# Patient Record
Sex: MALE | Race: BLACK OR AFRICAN AMERICAN | Employment: FULL TIME | URBAN - METROPOLITAN AREA
[De-identification: names, ages, dates, MRNs, and addresses within clinical notes are randomized per-mention and may not be internally consistent; named-entity substitution may affect disease eponyms.]

---

## 2017-04-01 ENCOUNTER — HOSPITAL ENCOUNTER (EMERGENCY)
Age: 22
Discharge: HOME | End: 2017-04-02
Attending: EMERGENCY MEDICINE | Admitting: EMERGENCY MEDICINE
Payer: COMMERCIAL

## 2017-04-01 ENCOUNTER — APPOINTMENT (OUTPATIENT)
Dept: GENERAL RADIOLOGY | Age: 22
End: 2017-04-01
Attending: EMERGENCY MEDICINE
Payer: COMMERCIAL

## 2017-04-01 VITALS
HEART RATE: 74 BPM | BODY MASS INDEX: 29.4 KG/M2 | TEMPERATURE: 98 F | RESPIRATION RATE: 18 BRPM | SYSTOLIC BLOOD PRESSURE: 146 MMHG | DIASTOLIC BLOOD PRESSURE: 80 MMHG | OXYGEN SATURATION: 97 % | HEIGHT: 71 IN | WEIGHT: 210 LBS

## 2017-04-01 DIAGNOSIS — S60.022A CONTUSION OF LEFT INDEX FINGER WITHOUT DAMAGE TO NAIL, INITIAL ENCOUNTER: ICD-10-CM

## 2017-04-01 DIAGNOSIS — S60.10XA SUBUNGUAL HEMATOMA OF FINGER OF LEFT HAND, INITIAL ENCOUNTER: Primary | ICD-10-CM

## 2017-04-01 PROCEDURE — 75810000106 HC EVAC SUBUNGUAL HEMATOMA

## 2017-04-01 PROCEDURE — 74011250636 HC RX REV CODE- 250/636: Performed by: PHYSICIAN ASSISTANT

## 2017-04-01 PROCEDURE — 99282 EMERGENCY DEPT VISIT SF MDM: CPT

## 2017-04-01 PROCEDURE — 96372 THER/PROPH/DIAG INJ SC/IM: CPT

## 2017-04-01 PROCEDURE — 73140 X-RAY EXAM OF FINGER(S): CPT

## 2017-04-01 RX ORDER — KETOROLAC TROMETHAMINE 30 MG/ML
30 INJECTION, SOLUTION INTRAMUSCULAR; INTRAVENOUS
Status: COMPLETED | OUTPATIENT
Start: 2017-04-01 | End: 2017-04-01

## 2017-04-01 RX ADMIN — KETOROLAC TROMETHAMINE 30 MG: 30 INJECTION, SOLUTION INTRAMUSCULAR at 23:20

## 2017-04-01 NOTE — LETTER
[facility] [place] 
THE [facility] EMERGENCY DEPT 
Humberto Fay [id number] [phone] Andra Fernandez Date: 4/2/2017 Sex: male Duarte Amador [address] [place] [address] YOB: 1995 Age: 25 y.o. Occupational Medicine Return to Work Form          Date of Treatment:  4/1/2017 Diagnosis: ICD-10-CM ICD-9-CM 1. Subungual hematoma of finger of left hand, initial encounter S60.10XA 923.3 2. Contusion of left index finger without damage to nail, initial encounter S60.022A 923.3 Instructions:  Employee must return copy of this report to Personnel and/or Supervisor. Patient Identification - Company Protocol:   
   Checked & Followed   Not Available PART I:  Check Treatment X-ray     Lab    Discharge Instructions Given    Rx Given Non-Prescription Meds    Sutures       EKG Other (Comment):   
 
Part II:  Disposition May Return to Regular Work Without Restrictions May Return to Restricted Duty as Below Explanation of RESTRICTIONS (Comment): May NOT Return to Work until cleared by Referral Specialist or Occupational Medicine MD 
 
Part III:  Follow-Up Follow-up Information Follow up With Details Comments Contact Info [facility] OCCUPATIONAL MED Schedule an appointment as soon as possible for a visit in 2 days for worker's compensation follow up [address] [place] [address] [address] [facility] [address] [phone] [facility] EMERGENCY DEPT Go to As needed, If symptoms worsen 2 Bernardine  
[address] [address] [phone] Part IV: Was Workers Comp Supervisor Notified     Yes     No 
 
Andra Fernandez was seen in the Emergency Department on 4/1/2017 by the following provider(s): 
Attending Provider: Luis Enrique Simons MD 
Physician Assistant: KADY Lauren; ED Nurse: PLEASE CALL CASE FACILITATOR, OCCUPATIONAL MEDICINE  
[phone] TO SCHEDULE AN APPOINTMENT Referral Physicians: Ivy Cardona MD 
[address]ony Abdi. [address] [facility]. [facility], [address] [address] [facility], [address] Phone:  [phone]; Fax:  [phone] ORTHOPEDICS [address] [facility] [address] [place] [address]., [address] [facility] [address] [place] . [address]    [facility], [facility] [address] 
[phone] [facility] [address] [address] [facility] [facility], [facility] [address] 
[phone]

## 2017-04-02 RX ORDER — TRAMADOL HYDROCHLORIDE 50 MG/1
50 TABLET ORAL
Qty: 20 TAB | Refills: 0 | Status: SHIPPED | OUTPATIENT
Start: 2017-04-02 | End: 2019-09-14

## 2017-04-02 NOTE — ED PROVIDER NOTES
[address]  EMERGENCY DEPARTMENT HISTORY AND PHYSICAL EXAM       Date: 4/1/2017   Patient Name: Nathan Toledo   YOB: 1995  Medical Record Number: [id number]    History of Presenting Illness     Chief Complaint   Patient presents with    Finger Pain        History Provided By:  patient    Additional History:   10:53 PM    Nathan Toledo is a 25 y.o. male presenting to the ED c/o left 2nd finger pain onset around 1200 while at work. States he jammed his finger at his occupation as a  and now has dark discoloration to his nail. Reports taking Advil earlier without relief. Denies numbness, tingling, weakness, and any other sxs or complaints. Primary Care Provider: None   Specialist:    Past History     Past Medical History:   History reviewed. No pertinent past medical history. Past Surgical History:   History reviewed. No pertinent surgical history. Family History:   History reviewed. No pertinent family history. Social History:   Social History   Substance Use Topics    Smoking status: Never Smoker    Smokeless tobacco: None    Alcohol use No        Allergies:   No Known Allergies     Review of Systems   Review of Systems   Musculoskeletal: Positive for arthralgias. Skin: Positive for color change (to right 2nd nail bed). Negative for wound. Neurological: Negative for weakness and numbness. All other systems reviewed and are negative. Physical Exam  Vitals:    04/01/17 2221   BP: 146/80   Pulse: 74   Resp: 18   Temp: 98 °F (36.7 °C)   SpO2: 97%   Weight: 95.3 kg (210 lb)   Height: 5' 11\" (1.803 m)       Physical Exam   Constitutional: He is oriented to person, place, and time. He appears well-developed and well-nourished. No distress. HENT:   Head: Normocephalic and atraumatic. Mouth/Throat: No oropharyngeal exudate. Eyes: Conjunctivae and EOM are normal. No scleral icterus. No pallor   Neck: Normal range of motion.  Neck supple. No JVD present. No tracheal deviation present. No thyromegaly present. Pulmonary/Chest: Effort normal. No stridor. No respiratory distress. Abdominal: Soft. Musculoskeletal: Normal range of motion. He exhibits tenderness. He exhibits no edema. Left index finger with 100% subungual hematoma and appropriately tender, no open lesions, FROM of DIPJ, PIPJ, MCP joints. No other soft tissue injuries. Pressure released from fingernail by continually twisting an 18 gauge needle though the superior nail bed until blood almost entirely evacuated from beneath   Neurological: He is alert and oriented to person, place, and time. He has normal reflexes. No cranial nerve deficit. Coordination normal.   Skin: Skin is warm and dry. No rash noted. He is not diaphoretic. No erythema. Psychiatric: His behavior is normal. Judgment and thought content normal.   Very anxious about any procedure, otherwise normal.     Nursing note and vitals reviewed. Diagnostic Study Results     Labs -    No results found for this or any previous visit (from the past 12 hour(s)). Radiologic Studies -    11:51 PM  RADIOLOGY FINDINGS  Left 2nd finger X-ray shows nothing acute  Pending review by Radiologist  Recorded by Sulema Seth ED Scribe, as dictated by Rose Marie Spangler PA-C    XR 2ND FINGER LT MIN 2 V    (Results Pending)        Medical Decision Making   I am the first provider for this patient. I reviewed the vital signs, available nursing notes, past medical history, past surgical history, family history and social history. Vital Signs-Reviewed the patient's vital signs. Patient Vitals for the past 12 hrs:   Temp Pulse Resp BP SpO2   04/01/17 2221 98 °F (36.7 °C) 74 18 146/80 97 %     Old Medical Records: Nursing notes    ED Course:    10:53 PM   Initial assessment performed.          Medications Given in the ED:  Medications   ketorolac (TORADOL) injection 30 mg (30 mg IntraMUSCular Given 4/1/17 8770) Discharge Note:  12:12 AM   Pt has been reexamined. Patient has no new complaints, changes, or physical findings. Care plan outlined and precautions discussed. Results were reviewed with the patient. All medications were reviewed with the patient; will d/c home. All of pt's questions and concerns were addressed. Patient was instructed and agrees to follow up with PCP, as well as to return to the ED upon further deterioration. Patient is ready to go home. Diagnosis   Clinical Impression:   1. Subungual hematoma of finger of left hand, initial encounter    2. Contusion of left index finger without damage to nail, initial encounter           Follow-up Information     Follow up With Details Comments Contact Info    [address] Schedule an appointment as soon as possible for a visit in 2 days for worker's compensation follow up [address]  [address]    THE [facility] EMERGENCY DEPT Go to As needed, If symptoms worsen 2 Susannah Carranza  [phone]          Current Discharge Medication List      START taking these medications    Details   traMADol (ULTRAM) 50 mg tablet Take 1 Tab by mouth every six (6) hours as needed for Pain. Max Daily Amount: 200 mg. Qty: 20 Tab, Refills: 0             _______________________________   Attestations: This note is prepared by Stewart Pride, acting as a Scribe for Theresa Jett PA-C on 10:43 PM on 4/1/2017 . Theresa Jett PA-C: The scribe's documentation has been prepared under my direction and personally reviewed by me in its entirety.   _______________________________

## 2017-04-02 NOTE — ED NOTES
Smashed finger today at work, bruising noted under fingernail causing pain, no open areas noted, good CNS and movement noted to finger.

## 2017-04-02 NOTE — ED NOTES
Discharge instructions given to pt. pt verbalized understanding discharge instructions. Patient left emergency department by foot  With self, in good condition. 1 Prescriptions given. Armband removed and shredded.

## 2019-09-14 ENCOUNTER — HOSPITAL ENCOUNTER (EMERGENCY)
Age: 24
Discharge: HOME | End: 2019-09-14
Attending: EMERGENCY MEDICINE
Payer: COMMERCIAL

## 2019-09-14 VITALS
HEART RATE: 66 BPM | RESPIRATION RATE: 18 BRPM | OXYGEN SATURATION: 99 % | SYSTOLIC BLOOD PRESSURE: 140 MMHG | DIASTOLIC BLOOD PRESSURE: 72 MMHG | TEMPERATURE: 98.9 F

## 2019-09-14 DIAGNOSIS — S61.212A LACERATION OF RIGHT MIDDLE FINGER WITHOUT FOREIGN BODY WITHOUT DAMAGE TO NAIL, INITIAL ENCOUNTER: Primary | ICD-10-CM
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
## 2019-09-14 PROCEDURE — 99282 EMERGENCY DEPT VISIT SF MDM: CPT

## 2019-09-14 PROCEDURE — 90715 TDAP VACCINE 7 YRS/> IM: CPT | Performed by: PHYSICIAN ASSISTANT

## 2019-09-14 PROCEDURE — 74011250636 HC RX REV CODE- 250/636: Performed by: PHYSICIAN ASSISTANT

## 2019-09-14 PROCEDURE — 90471 IMMUNIZATION ADMIN: CPT

## 2019-09-14 RX ORDER — AMOXICILLIN AND CLAVULANATE POTASSIUM 875; 125 MG/1; MG/1
1 TABLET, FILM COATED ORAL 2 TIMES DAILY
Qty: 20 TAB | Refills: 0 | Status: SHIPPED | OUTPATIENT
Start: 2019-09-14 | End: 2019-09-24

## 2019-09-14 RX ADMIN — TETANUS TOXOID, REDUCED DIPHTHERIA TOXOID AND ACELLULAR PERTUSSIS VACCINE, ADSORBED 0.5 ML: 5; 2.5; 8; 8; 2.5 SUSPENSION INTRAMUSCULAR at 22:17

## 2019-09-15 NOTE — DISCHARGE INSTRUCTIONS
Animal Bites: Care Instructions  Your Care Instructions  After an animal bite, the biggest concern is infection. The chance of infection depends on the type of animal that bit you, where on your body you were bitten, and your general health. Many animal bites are not closed with stitches, because this can increase the chance of infection. Your bite may take as little as 7 days or as long as several months to heal, depending on how bad it is. Taking good care of your wound at home will help it heal and reduce your chance of infection. The doctor has checked you carefully, but problems can develop later. If you notice any problems or new symptoms, get medical treatment right away. Follow-up care is a key part of your treatment and safety. Be sure to make and go to all appointments, and call your doctor if you are having problems. It's also a good idea to know your test results and keep a list of the medicines you take. How can you care for yourself at home? · If your doctor told you how to care for your wound, follow your doctor's instructions. If you did not get instructions, follow this general advice:  ? After 24 to 48 hours, gently wash the wound with clean water 2 times a day. Do not scrub or soak the wound. Don't use hydrogen peroxide or alcohol, which can slow healing. ? You may cover the wound with a thin layer of petroleum jelly, such as Vaseline, and a nonstick bandage. ? Apply more petroleum jelly and replace the bandage as needed. · After you shower, gently dry the wound with a clean towel. · If your doctor has closed the wound, cover the bandage with a plastic bag before you take a shower. · A small amount of skin redness and swelling around the wound edges and the stitches or staples is normal. Your wound may itch or feel irritated. Do not scratch or rub the wound.   · Ask your doctor if you can take an over-the-counter pain medicine, such as acetaminophen (Tylenol), ibuprofen (Advil, Motrin), or naproxen (Aleve). Read and follow all instructions on the label. · Do not take two or more pain medicines at the same time unless the doctor told you to. Many pain medicines have acetaminophen, which is Tylenol. Too much acetaminophen (Tylenol) can be harmful. · If your bite puts you at risk for rabies, you will get a series of shots over the next few weeks to prevent rabies. Your doctor will tell you when to get the shots. It is very important that you get the full cycle of shots. Follow your doctor's instructions exactly. · You may need a tetanus shot if you have not received one in the last 5 years. · If your doctor prescribed antibiotics, take them as directed. Do not stop taking them just because you feel better. You need to take the full course of antibiotics. When should you call for help? Call your doctor now or seek immediate medical care if:    · The skin near the bite turns cold or pale or it changes color.     · You lose feeling in the area near the bite, or it feels numb or tingly.     · You have trouble moving a limb near the bite.     · You have symptoms of infection, such as:  ? Increased pain, swelling, warmth, or redness near the wound. ? Red streaks leading from the wound. ? Pus draining from the wound. ? A fever.     · Blood soaks through the bandage. Oozing small amounts of blood is normal.     · Your pain is getting worse.    Watch closely for changes in your health, and be sure to contact your doctor if you are not getting better as expected. Where can you learn more? Go to http://shawn-nolna.info/. Enter O857 in the search box to learn more about \"Animal Bites: Care Instructions. \"  Current as of: September 23, 2018  Content Version: 12.1  © 9659-1351 [employer]. Care instructions adapted under license by [employer] (which disclaims liability or warranty for this information).  If you have questions about a medical condition or this instruction, always ask your healthcare professional. [place] [address] any warranty or liability for your use of this information.

## 2019-09-15 NOTE — ED PROVIDER NOTES
EMERGENCY DEPARTMENT HISTORY AND PHYSICAL EXAM    Date: 9/14/2019  Patient Name: Justine Glover    History of Presenting Illness     Chief Complaint   Patient presents with    Laceration         History Provided By: Patient    Chief Complaint: laceration      Additional History (Context):   9:56 PM  Justine Glover is a 25 y.o. male presents to the emergency department C/O right middle finger laceration after his puppy bit him while they were playing earlier tonight. He has full range of motion of the finger. States the dogs vaccinations are not up-to-date. He is unsure of his tetanus status. PCP: None        Past History     Past Medical History:  History reviewed. No pertinent past medical history. Past Surgical History:  History reviewed. No pertinent surgical history. Family History:  History reviewed. No pertinent family history. Social History:  Social History     Tobacco Use    Smoking status: Never Smoker    Smokeless tobacco: Never Used   Substance Use Topics    Alcohol use: No    Drug use: Not on file       Allergies:  No Known Allergies    Review of Systems   Review of Systems   Constitutional: Negative for chills and fever. Respiratory: Negative for shortness of breath. Cardiovascular: Negative for chest pain. Musculoskeletal: Negative for arthralgias. Skin: Positive for wound (right middle finger lac). Neurological: Negative for weakness and numbness. All other systems reviewed and are negative. Physical Exam     Vitals:    09/14/19 2153   BP: 140/72   Pulse: 66   Resp: 18   Temp: 98.9 °F (37.2 °C)   SpO2: 99%     Physical Exam   Constitutional: He is oriented to person, place, and time. He appears well-developed and well-nourished. Alert well-appearing nontoxic   HENT:   Head: Normocephalic and atraumatic. Cardiovascular: Normal rate, regular rhythm, normal heart sounds and intact distal pulses. No murmur heard.   Pulmonary/Chest: Effort normal and breath sounds normal. No respiratory distress. He has no wheezes. He has no rales. Musculoskeletal:        Hands:  Neurological: He is alert and oriented to person, place, and time. Skin:   See musculoskeletal exam   Psychiatric: He has a normal mood and affect. Judgment normal.   Nursing note and vitals reviewed. Diagnostic Study Results     Labs:   No results found for this or any previous visit (from the past 12 hour(s)). Radiologic Studies:   No orders to display     CT Results  (Last 48 hours)    None        CXR Results  (Last 48 hours)    None          Medical Decision Making   I am the first provider for this patient. I reviewed the vital signs, available nursing notes, past medical history, past surgical history, family history and social history. Vital Signs: Reviewed the patient's vital signs. Pulse Oximetry Analysis: 100% on RA     Records Reviewed: Nursing Notes and Old Medical Records    Procedures:  Procedures    ED Course:   9:56 PM Initial assessment performed. The patients presenting problems have been discussed, and they are in agreement with the care plan formulated and outlined with them. I have encouraged them to ask questions as they arise throughout their visit. Discussion:  Pt presents with superficial laceration of the right middle finger after playing with his dog. The puppy has not had any vaccinations yet. Do not suspect any tendon or nerve involvement, no foreign bodies seen in the wound. Will update patient's tetanus. Will have patient quarantine and observe at the dog for any signs or symptoms of rabies. Will treat with Augmentin. Strict return precautions given, pt offering no questions or complaints. Diagnosis and Disposition     DISCHARGE NOTE:  Delgado Quispe  results have been reviewed with him. He has been counseled regarding his diagnosis, treatment, and plan.   He verbally conveys understanding and agreement of the signs, symptoms, diagnosis, treatment and prognosis and additionally agrees to follow up as discussed. He also agrees with the care-plan and conveys that all of his questions have been answered. I have also provided discharge instructions for him that include: educational information regarding their diagnosis and treatment, and list of reasons why they would want to return to the ED prior to their follow-up appointment, should his condition change. He has been provided with education for proper emergency department utilization. CLINICAL IMPRESSION:    1. Laceration of right middle finger without foreign body without damage to nail, initial encounter    2. Dog bite, initial encounter        PLAN:  1. D/C Home  2. Current Discharge Medication List      START taking these medications    Details   amoxicillin-clavulanate (AUGMENTIN) 875-125 mg per tablet Take 1 Tab by mouth two (2) times a day for 10 days. Qty: 20 Tab, Refills: 0           3. Follow-up Information     Follow up With Specialties Details Why Contact Info    [facility]   call for follow up and recheck  [address] [facility], [address] [place] [address] [address] [place] [address] [address]    [facility] EMERGENCY DEPT Emergency Medicine  If symptoms worsen 2 Susannah Morgan [address]  [phone]                 Please note that this dictation was completed with [employer], the computer voice recognition software. Quite often unanticipated grammatical, syntax, homophones, and other interpretive errors are inadvertently transcribed by the computer software. Please disregard these errors. Please excuse any errors that have escaped final proofreading.